# Patient Record
Sex: FEMALE | URBAN - METROPOLITAN AREA
[De-identification: names, ages, dates, MRNs, and addresses within clinical notes are randomized per-mention and may not be internally consistent; named-entity substitution may affect disease eponyms.]

---

## 2021-01-04 ENCOUNTER — APPOINTMENT (RX ONLY)
Dept: URBAN - METROPOLITAN AREA CLINIC 158 | Facility: CLINIC | Age: 86
Setting detail: DERMATOLOGY
End: 2021-01-04

## 2021-01-04 DIAGNOSIS — D22 MELANOCYTIC NEVI: ICD-10-CM

## 2021-01-04 DIAGNOSIS — L28.0 LICHEN SIMPLEX CHRONICUS: ICD-10-CM

## 2021-01-04 DIAGNOSIS — L82.1 OTHER SEBORRHEIC KERATOSIS: ICD-10-CM

## 2021-01-04 PROBLEM — D22.4 MELANOCYTIC NEVI OF SCALP AND NECK: Status: ACTIVE | Noted: 2021-01-04

## 2021-01-04 PROCEDURE — ? PRESCRIPTION

## 2021-01-04 PROCEDURE — ? COUNSELING

## 2021-01-04 PROCEDURE — ? PATIENT SPECIFIC COUNSELING

## 2021-01-04 PROCEDURE — ? EDUCATIONAL RESOURCES PROVIDED

## 2021-01-04 PROCEDURE — 99203 OFFICE O/P NEW LOW 30 MIN: CPT

## 2021-01-04 RX ORDER — ALCLOMETASONE DIPROPIONATE 0.5 MG/G
1 CREAM TOPICAL QD
Qty: 1 | Refills: 1 | Status: ERX | COMMUNITY
Start: 2021-01-04

## 2021-01-04 RX ADMIN — ALCLOMETASONE DIPROPIONATE 1: 0.5 CREAM TOPICAL at 00:00

## 2021-01-04 ASSESSMENT — LOCATION ZONE DERM
LOCATION ZONE: NECK
LOCATION ZONE: FACE
LOCATION ZONE: HAND
LOCATION ZONE: TRUNK

## 2021-01-04 ASSESSMENT — LOCATION DETAILED DESCRIPTION DERM
LOCATION DETAILED: LEFT MEDIAL SUPERIOR CHEST
LOCATION DETAILED: LEFT INFERIOR LATERAL NECK
LOCATION DETAILED: RIGHT CENTRAL MALAR CHEEK
LOCATION DETAILED: RIGHT LATERAL BUCCAL CHEEK
LOCATION DETAILED: LEFT LATERAL FOREHEAD
LOCATION DETAILED: LEFT RADIAL DORSAL HAND

## 2021-01-04 ASSESSMENT — LOCATION SIMPLE DESCRIPTION DERM
LOCATION SIMPLE: LEFT FOREHEAD
LOCATION SIMPLE: RIGHT CHEEK
LOCATION SIMPLE: LEFT HAND
LOCATION SIMPLE: LEFT ANTERIOR NECK
LOCATION SIMPLE: CHEST

## 2021-01-04 NOTE — PROCEDURE: PATIENT SPECIFIC COUNSELING
Detail Level: Simple
Patient admits to repeated scratching and rubbing due to itching.  I will start patient on alclometason cream BID.  I stressed to patient the importance of breaking the itch-scratch cycle.

## 2023-03-10 ENCOUNTER — OFFICE VISIT (OUTPATIENT)
Dept: URBAN - METROPOLITAN AREA CLINIC 33 | Facility: CLINIC | Age: 88
End: 2023-03-10
Payer: COMMERCIAL

## 2023-03-10 DIAGNOSIS — H16.012 CENTRAL CORNEAL ULCER, LEFT EYE: Primary | ICD-10-CM

## 2023-03-10 PROCEDURE — 99203 OFFICE O/P NEW LOW 30 MIN: CPT | Performed by: OPHTHALMOLOGY

## 2023-03-10 RX ORDER — PREDNISOLONE ACETATE 10 MG/ML
1 % SUSPENSION/ DROPS OPHTHALMIC
Qty: 15 | Refills: 2 | Status: ACTIVE
Start: 2023-03-10

## 2023-03-10 RX ORDER — OFLOXACIN 3 MG/ML
0.3 % SOLUTION/ DROPS OPHTHALMIC
Qty: 5 | Refills: 6 | Status: ACTIVE
Start: 2023-03-10

## 2023-03-10 ASSESSMENT — INTRAOCULAR PRESSURE
OS: 14
OD: 13

## 2023-03-10 NOTE — IMPRESSION/PLAN
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 Condition: quality of life issue. Symptoms: will treat with meds. Vision: vision affected. Plan: Discussed diagnosis in detail with patient. Discussed treatment options with patient. New medication(s) Rx given today. Start Ofloxacin and Prednisolone QID OS. Stressed compliance. Observe. Discussed possible need for surgery in the future. Defer at this time.

## 2023-03-24 ENCOUNTER — OFFICE VISIT (OUTPATIENT)
Dept: URBAN - METROPOLITAN AREA CLINIC 33 | Facility: CLINIC | Age: 88
End: 2023-03-24
Payer: MEDICARE

## 2023-03-24 DIAGNOSIS — H17.89 OTHER CORNEAL SCARS AND OPACITIES: Primary | ICD-10-CM

## 2023-03-24 PROCEDURE — 99213 OFFICE O/P EST LOW 20 MIN: CPT | Performed by: OPHTHALMOLOGY

## 2023-03-24 NOTE — IMPRESSION/PLAN
Impression: Other corneal scars and opacities: H17.89. Condition: quality of life issue. Symptoms: will treat with meds. Plan: Discussed diagnosis in detail with patient. Discussed treatment options with patient. Stop Ofloxacin and Neomycin. Continue Prednisolone Acetate QID OS. Stressed compliance. Discussed possible need for surgery in the future. Explained to patient that the eye needs to be as healthy as possible prior to proceeding with surgery. Defer surgery at this time. Observe closely.

## 2023-04-07 ENCOUNTER — OFFICE VISIT (OUTPATIENT)
Dept: URBAN - METROPOLITAN AREA CLINIC 33 | Facility: CLINIC | Age: 88
End: 2023-04-07
Payer: MEDICARE

## 2023-04-07 DIAGNOSIS — H17.89 OTHER CORNEAL SCARS AND OPACITIES: Primary | ICD-10-CM

## 2023-04-07 PROCEDURE — 99213 OFFICE O/P EST LOW 20 MIN: CPT | Performed by: OPHTHALMOLOGY

## 2023-04-07 RX ORDER — OFLOXACIN 3 MG/ML
0.3 % SOLUTION/ DROPS OPHTHALMIC
Qty: 5 | Refills: 6 | Status: ACTIVE
Start: 2023-04-07

## 2023-04-07 RX ORDER — PREDNISOLONE ACETATE 10 MG/ML
1 % SUSPENSION/ DROPS OPHTHALMIC
Qty: 15 | Refills: 3 | Status: ACTIVE
Start: 2023-04-07

## 2023-04-07 ASSESSMENT — INTRAOCULAR PRESSURE
OD: 12
OS: 11

## 2023-04-07 NOTE — IMPRESSION/PLAN
Impression: Other corneal scars and opacities: H17.89. Condition: quality of life issue. Symptoms: could improve with surgery. Vision: vision affected. Left. Plan: Discussed diagnosis in detail with patient. Discussed treatment options with patient. Discussed ongoing topical therapy vs surgery. Surgical risks and benefits were discussed, explained and understood by patient. Patient elects to have surgery. [id number] Discussed post operative requirements and restrictions.

## 2023-05-16 ENCOUNTER — SURGERY (OUTPATIENT)
Dept: URBAN - METROPOLITAN AREA SURGERY 15 | Facility: SURGERY | Age: 88
End: 2023-05-16

## 2023-05-16 PROCEDURE — 65730 CORNEAL TRANSPLANT: CPT | Performed by: OPHTHALMOLOGY

## 2023-05-17 ENCOUNTER — POST-OPERATIVE VISIT (OUTPATIENT)
Dept: URBAN - METROPOLITAN AREA CLINIC 33 | Facility: CLINIC | Age: 88
End: 2023-05-17
Payer: MEDICARE

## 2023-05-17 DIAGNOSIS — Z48.810 ENCOUNTER FOR SURGICAL AFTERCARE FOLLOWING SURGERY ON A SENSE ORGAN: Primary | ICD-10-CM

## 2023-05-17 PROCEDURE — 99024 POSTOP FOLLOW-UP VISIT: CPT | Performed by: OPTOMETRIST

## 2023-05-17 ASSESSMENT — INTRAOCULAR PRESSURE: OD: 12

## 2023-05-17 NOTE — IMPRESSION/PLAN
Impression: S/P Penetrating keratoplasty OS - 1 Day. Encounter for surgical aftercare following surgery on a sense organ  Z48.810. Plan: Discussed normal amount of inflammation. Discussed wearing patch when sleeping. Continue Ofloxacin QID OS and Prednisolone QID OS. Keep appointment with Dr. Dianne Moe.

## 2023-05-26 ENCOUNTER — POST-OPERATIVE VISIT (OUTPATIENT)
Dept: URBAN - METROPOLITAN AREA CLINIC 33 | Facility: CLINIC | Age: 88
End: 2023-05-26
Payer: MEDICARE

## 2023-05-26 DIAGNOSIS — Z48.810 ENCOUNTER FOR SURGICAL AFTERCARE FOLLOWING SURGERY ON A SENSE ORGAN: Primary | ICD-10-CM

## 2023-05-26 PROCEDURE — 99024 POSTOP FOLLOW-UP VISIT: CPT | Performed by: OPHTHALMOLOGY

## 2023-05-26 ASSESSMENT — INTRAOCULAR PRESSURE
OS: 26
OD: 12

## 2023-05-26 NOTE — IMPRESSION/PLAN
Impression: S/P Penetrating keratoplasty OS - 10 Days. Encounter for surgical aftercare following surgery on a sense organ  Z48.810.  Plan: Pred QID OS

## 2024-05-16 ENCOUNTER — OFFICE VISIT (OUTPATIENT)
Dept: URBAN - METROPOLITAN AREA CLINIC 45 | Facility: CLINIC | Age: 89
End: 2024-05-16
Payer: COMMERCIAL

## 2024-05-16 DIAGNOSIS — H52.4 PRESBYOPIA: ICD-10-CM

## 2024-05-16 DIAGNOSIS — H17.89 OTHER CORNEAL SCARS AND OPACITIES: Primary | ICD-10-CM

## 2024-05-16 PROCEDURE — 92014 COMPRE OPH EXAM EST PT 1/>: CPT

## 2024-05-16 ASSESSMENT — VISUAL ACUITY
OD: 20/40
OS: 20/70

## 2024-05-16 ASSESSMENT — INTRAOCULAR PRESSURE
OD: 16
OS: 16